# Patient Record
Sex: FEMALE | Race: ASIAN
[De-identification: names, ages, dates, MRNs, and addresses within clinical notes are randomized per-mention and may not be internally consistent; named-entity substitution may affect disease eponyms.]

---

## 2017-07-03 ENCOUNTER — HOSPITAL ENCOUNTER (OUTPATIENT)
Dept: HOSPITAL 76 - DI | Age: 62
Discharge: HOME | End: 2017-07-03
Attending: FAMILY MEDICINE
Payer: COMMERCIAL

## 2017-07-03 DIAGNOSIS — Z13.820: Primary | ICD-10-CM

## 2017-07-03 DIAGNOSIS — M85.89: ICD-10-CM

## 2017-07-03 PROCEDURE — 77080 DXA BONE DENSITY AXIAL: CPT

## 2018-05-09 ENCOUNTER — HOSPITAL ENCOUNTER (EMERGENCY)
Dept: HOSPITAL 76 - ED | Age: 63
Discharge: HOME | End: 2018-05-09
Payer: COMMERCIAL

## 2018-05-09 VITALS — SYSTOLIC BLOOD PRESSURE: 133 MMHG | DIASTOLIC BLOOD PRESSURE: 60 MMHG

## 2018-05-09 DIAGNOSIS — N12: Primary | ICD-10-CM

## 2018-05-09 DIAGNOSIS — R00.0: ICD-10-CM

## 2018-05-09 LAB
ALBUMIN DIAFP-MCNC: 4 G/DL (ref 3.2–5.5)
ALBUMIN/GLOB SERPL: 1.1 {RATIO} (ref 1–2.2)
ALP SERPL-CCNC: 62 IU/L (ref 42–121)
ALT SERPL W P-5'-P-CCNC: 28 IU/L (ref 10–60)
ANION GAP SERPL CALCULATED.4IONS-SCNC: 9 MMOL/L (ref 6–13)
AST SERPL W P-5'-P-CCNC: 36 IU/L (ref 10–42)
BASOPHILS NFR BLD AUTO: 0 10^3/UL (ref 0–0.1)
BASOPHILS NFR BLD AUTO: 0.4 %
BILIRUB BLD-MCNC: 1 MG/DL (ref 0.2–1)
BUN SERPL-MCNC: 20 MG/DL (ref 6–20)
CALCIUM UR-MCNC: 8.8 MG/DL (ref 8.5–10.3)
CHLORIDE SERPL-SCNC: 99 MMOL/L (ref 101–111)
CLARITY UR REFRACT.AUTO: CLEAR
CO2 SERPL-SCNC: 27 MMOL/L (ref 21–32)
CREAT SERPLBLD-SCNC: 0.8 MG/DL (ref 0.4–1)
EOSINOPHIL # BLD AUTO: 0 10^3/UL (ref 0–0.7)
EOSINOPHIL NFR BLD AUTO: 0 %
ERYTHROCYTE [DISTWIDTH] IN BLOOD BY AUTOMATED COUNT: 15.6 % (ref 12–15)
GFRSERPLBLD MDRD-ARVRAT: 73 ML/MIN/{1.73_M2} (ref 89–?)
GLOBULIN SER-MCNC: 3.5 G/DL (ref 2.1–4.2)
GLUCOSE SERPL-MCNC: 117 MG/DL (ref 70–100)
GLUCOSE UR QL STRIP.AUTO: NEGATIVE MG/DL
HGB UR QL STRIP: 11.1 G/DL (ref 12–16)
KETONES UR QL STRIP.AUTO: NEGATIVE MG/DL
LIPASE SERPL-CCNC: 62 U/L (ref 22–51)
LYMPHOCYTES # SPEC AUTO: 0.3 10^3/UL (ref 1.5–3.5)
LYMPHOCYTES NFR BLD AUTO: 5.6 %
MCH RBC QN AUTO: 18.9 PG (ref 27–31)
MCHC RBC AUTO-ENTMCNC: 31.9 G/DL (ref 32–36)
MCV RBC AUTO: 59.1 FL (ref 81–99)
MONOCYTES # BLD AUTO: 0.9 10^3/UL (ref 0–1)
MONOCYTES NFR BLD AUTO: 17.6 %
MUCOUS THREADS URNS QL MICRO: (no result)
NEUTROPHILS # BLD AUTO: 3.8 10^3/UL (ref 1.5–6.6)
NEUTROPHILS # SNV AUTO: 5 X10^3/UL (ref 4.8–10.8)
NEUTROPHILS NFR BLD AUTO: 76.4 %
NITRITE UR QL STRIP.AUTO: NEGATIVE
PDW BLD AUTO: 8.6 FL (ref 7.9–10.8)
PH UR STRIP.AUTO: 6 PH (ref 5–7.5)
PLATELET # BLD: 121 10^3/UL (ref 130–450)
PROT SPEC-MCNC: 7.5 G/DL (ref 6.7–8.2)
PROT UR STRIP.AUTO-MCNC: 30 MG/DL
RBC # UR STRIP.AUTO: NEGATIVE /UL
RBC # URNS HPF: (no result) /HPF (ref 0–5)
RBC MAR: 5.89 10^6/UL (ref 4.2–5.4)
RBC MORPH BLD: (no result)
SODIUM SERPLBLD-SCNC: 135 MMOL/L (ref 135–145)
SP GR UR STRIP.AUTO: >=1.03 (ref 1–1.03)
SQUAMOUS URNS QL MICRO: (no result)
UROBILINOGEN UR QL STRIP.AUTO: (no result) E.U./DL
UROBILINOGEN UR STRIP.AUTO-MCNC: NEGATIVE MG/DL

## 2018-05-09 PROCEDURE — 80053 COMPREHEN METABOLIC PANEL: CPT

## 2018-05-09 PROCEDURE — 87086 URINE CULTURE/COLONY COUNT: CPT

## 2018-05-09 PROCEDURE — 83605 ASSAY OF LACTIC ACID: CPT

## 2018-05-09 PROCEDURE — 99283 EMERGENCY DEPT VISIT LOW MDM: CPT

## 2018-05-09 PROCEDURE — 36415 COLL VENOUS BLD VENIPUNCTURE: CPT

## 2018-05-09 PROCEDURE — 81003 URINALYSIS AUTO W/O SCOPE: CPT

## 2018-05-09 PROCEDURE — 83690 ASSAY OF LIPASE: CPT

## 2018-05-09 PROCEDURE — 85025 COMPLETE CBC W/AUTO DIFF WBC: CPT

## 2018-05-09 PROCEDURE — 99284 EMERGENCY DEPT VISIT MOD MDM: CPT

## 2018-05-09 PROCEDURE — 96365 THER/PROPH/DIAG IV INF INIT: CPT

## 2018-05-09 PROCEDURE — 81001 URINALYSIS AUTO W/SCOPE: CPT

## 2018-05-09 PROCEDURE — 96375 TX/PRO/DX INJ NEW DRUG ADDON: CPT

## 2019-07-26 ENCOUNTER — HOSPITAL ENCOUNTER (OUTPATIENT)
Age: 64
End: 2019-07-26
Payer: OTHER GOVERNMENT

## 2019-07-26 DIAGNOSIS — R60.9: ICD-10-CM

## 2019-07-26 DIAGNOSIS — S92.321A: Primary | ICD-10-CM

## 2019-07-26 PROCEDURE — A9579 GAD-BASE MR CONTRAST NOS,1ML: HCPCS

## 2019-07-26 PROCEDURE — 73720 MRI LWR EXTREMITY W/O&W/DYE: CPT

## 2019-07-26 NOTE — DI.MRI.S_ITS
PROCEDURE:  MR FOOT RT WO/W CON  
   
INDICATIONS:  DISPLACED FRACTURE OF SECOND METATARSAL RIGHT FOOT additional clinic notes   
indicate the study is performed to assess for avascular necrosis of the second metatarsal   
head.  
   
TECHNIQUE:    
Noncontrast sagittal T1 spin echo and T2 fast spin echo with fat saturation, long-axis T1   
spin echo and T2 fast spin echo with fat saturation; short-axis T1 spin echo, proton   
density fast spin echo, and T2 fast spin echo with fat saturation through the forefoot.    
Post-contrast short axis, long axis, and sagittal T1 spin echo with fat saturation   
through the forefoot.    
   
COMPARISON:  None.  
   
FINDINGS:    
Image quality:  Excellent.    
   
Bones and joints:  No suspicious osseous enhancement.  Through the visualized hindfoot   
and midfoot.  There is a fracture involving the second metatarsal bone, with marrow space   
edema predominantly distally and diminishing proximally, and the fracture plane appears   
to be at the metatarsal head/neck junction.  No additional areas of bone marrow   
contusions are present nor is there evidence of metatarsal stress fractures.  The area of   
the second metatarsal head/neck junction fracture and the metatarsal head itself shows   
contrast enhancement, indicating patent vascular supply rather than avascular necrosis.    
The sesamoid bones appear in expected positions, without internal edema.  No   
metatarsophalangeal joint degeneration.  No intraosseous lesions.  There is mild edema at   
the medial first metatarsal head in this patient with what appears to be mild bunion   
formation in that area.  
   
Soft tissues:  No suspicious soft tissue enhancement.  The visualized plantar foot   
muscles demonstrate normal signal and bulk.  Visualized flexor and extensor tendons   
appear intact, without tenosynovitis.  The distal insertions of the peroneus brevis and   
longus tendons appear intact.  The principal Lisfranc ligament appears intact.  No soft   
tissue ganglion cysts or bursal fluid collections.  Sagittal images demonstrate no   
evidence for plantar plate tears.    
   
IMPRESSION: Comparison plain film or CT scanning has not been provided for review.  This   
MR study represents the single available right foot imaging.  By location and appearance   
the second metatarsal head/neck junction fracture appears likely posttraumatic rather   
than pathologic.  There is expected contrast enhancement at the metatarsal head and neck,   
which argues towards intact blood supply into these regions rather than avascular   
necrosis.  
   
Contrast enhancement tapers proximally away from the entry into the metatarsal shaft and   
towards the base of the second metatarsal.  This is an expected finding in the setting of   
distal traumatic fracture.  No secondary stress fracture or stress reaction appears   
present.  
   
Note is made of mild edema involving the medial first metatarsal head perhaps related to   
bunion formation or even potentially mild gout.  
   
   
   
Dictated by: Srinivas Arreguin M.D. on 7/26/2019 at 17:02       
Approved by: Srinivas Arreguin M.D. on 7/26/2019 at 17:08

## 2020-11-23 ENCOUNTER — HOSPITAL ENCOUNTER (OUTPATIENT)
Dept: HOSPITAL 76 - DI.N | Age: 65
Discharge: HOME | End: 2020-11-23
Attending: GENERAL ACUTE CARE HOSPITAL
Payer: COMMERCIAL

## 2020-11-23 DIAGNOSIS — Z12.31: Primary | ICD-10-CM

## 2021-12-13 ENCOUNTER — HOSPITAL ENCOUNTER (OUTPATIENT)
Dept: HOSPITAL 76 - DI.N | Age: 66
Discharge: HOME | End: 2021-12-13
Attending: GENERAL ACUTE CARE HOSPITAL
Payer: COMMERCIAL

## 2021-12-13 DIAGNOSIS — Z12.31: Primary | ICD-10-CM

## 2022-04-25 ENCOUNTER — HOSPITAL ENCOUNTER (OUTPATIENT)
Dept: HOSPITAL 76 - DI | Age: 67
Discharge: HOME | End: 2022-04-25
Attending: INTERNAL MEDICINE
Payer: COMMERCIAL

## 2022-04-25 DIAGNOSIS — M85.89: ICD-10-CM

## 2022-04-25 DIAGNOSIS — N95.1: Primary | ICD-10-CM

## 2022-12-20 ENCOUNTER — HOSPITAL ENCOUNTER (OUTPATIENT)
Dept: HOSPITAL 76 - DI.N | Age: 67
Discharge: HOME | End: 2022-12-20
Attending: GENERAL ACUTE CARE HOSPITAL
Payer: COMMERCIAL

## 2022-12-20 DIAGNOSIS — Z12.31: Primary | ICD-10-CM

## 2023-12-05 ENCOUNTER — HOSPITAL ENCOUNTER (OUTPATIENT)
Dept: HOSPITAL 76 - DI | Age: 68
Discharge: HOME | End: 2023-12-05
Attending: GENERAL ACUTE CARE HOSPITAL
Payer: MEDICARE

## 2023-12-05 DIAGNOSIS — Z12.31: Primary | ICD-10-CM

## 2023-12-05 DIAGNOSIS — R92.333: ICD-10-CM
